# Patient Record
Sex: MALE | Race: WHITE | NOT HISPANIC OR LATINO | Employment: FULL TIME | URBAN - METROPOLITAN AREA
[De-identification: names, ages, dates, MRNs, and addresses within clinical notes are randomized per-mention and may not be internally consistent; named-entity substitution may affect disease eponyms.]

---

## 2023-08-15 ENCOUNTER — OFFICE VISIT (OUTPATIENT)
Dept: URGENT CARE | Facility: CLINIC | Age: 33
End: 2023-08-15
Payer: OTHER GOVERNMENT

## 2023-08-15 VITALS
RESPIRATION RATE: 16 BRPM | BODY MASS INDEX: 26.68 KG/M2 | SYSTOLIC BLOOD PRESSURE: 112 MMHG | DIASTOLIC BLOOD PRESSURE: 66 MMHG | HEIGHT: 67 IN | OXYGEN SATURATION: 95 % | WEIGHT: 170 LBS | TEMPERATURE: 98 F | HEART RATE: 82 BPM

## 2023-08-15 DIAGNOSIS — H66.002 ACUTE SUPPURATIVE OTITIS MEDIA OF LEFT EAR WITHOUT SPONTANEOUS RUPTURE OF TYMPANIC MEMBRANE, RECURRENCE NOT SPECIFIED: ICD-10-CM

## 2023-08-15 DIAGNOSIS — J01.90 ACUTE NON-RECURRENT SINUSITIS, UNSPECIFIED LOCATION: ICD-10-CM

## 2023-08-15 DIAGNOSIS — R05.9 COUGH, UNSPECIFIED TYPE: ICD-10-CM

## 2023-08-15 PROCEDURE — 99203 OFFICE O/P NEW LOW 30 MIN: CPT | Performed by: PHYSICIAN ASSISTANT

## 2023-08-15 PROCEDURE — 3074F SYST BP LT 130 MM HG: CPT | Performed by: PHYSICIAN ASSISTANT

## 2023-08-15 PROCEDURE — 3078F DIAST BP <80 MM HG: CPT | Performed by: PHYSICIAN ASSISTANT

## 2023-08-15 RX ORDER — PREDNISONE 20 MG/1
20 TABLET ORAL 2 TIMES DAILY
Qty: 10 TABLET | Refills: 0 | Status: SHIPPED | OUTPATIENT
Start: 2023-08-15 | End: 2023-08-20

## 2023-08-15 RX ORDER — AMOXICILLIN AND CLAVULANATE POTASSIUM 875; 125 MG/1; MG/1
1 TABLET, FILM COATED ORAL 2 TIMES DAILY
Qty: 14 TABLET | Refills: 0 | Status: SHIPPED | OUTPATIENT
Start: 2023-08-15 | End: 2023-08-22

## 2023-08-15 RX ORDER — BENZONATATE 200 MG/1
200 CAPSULE ORAL 3 TIMES DAILY PRN
Qty: 60 CAPSULE | Refills: 0 | Status: SHIPPED | OUTPATIENT
Start: 2023-08-15

## 2023-08-21 ASSESSMENT — ENCOUNTER SYMPTOMS
SINUS PAIN: 1
SHORTNESS OF BREATH: 0
FEVER: 0
SORE THROAT: 0
WHEEZING: 0
HEADACHES: 1
MYALGIAS: 1
COUGH: 1
STRIDOR: 0
SPUTUM PRODUCTION: 1

## 2023-08-21 NOTE — PROGRESS NOTES
"Subjective     Vijay Kerns is a 33 y.o. male who presents with Otalgia (X2 1/2 days left ear, congestion )            Patient presents clinic today with complaint of left ear pain and pressure for the last 3 days.  PT has had nasal congestion and cough for the last two weeks.  PT son and wife are both sick on son is on abx and steroids, wife is on steroids for cough.  Patient and his family have been traveling for the last several weeks, they live in [place] but have been to [place] as well as [place] in the last 3 weeks.  All COVID testing has been negative.  Patient denies fever, chills, nausea, vomiting or dizziness.  Patient denies any other complaint.    Otalgia   There is pain in the left ear. This is a new problem. The current episode started in the past 7 days. The problem occurs constantly. The problem has been rapidly worsening. There has been no fever. The pain is moderate. Associated symptoms include coughing and headaches. Pertinent negatives include no ear discharge, hearing loss or sore throat. He has tried ear drops, NSAIDs and heat packs for the symptoms. The treatment provided no relief. There is no history of a chronic ear infection, hearing loss or a tympanostomy tube.       Review of Systems   Constitutional:  Positive for malaise/fatigue. Negative for fever.   HENT:  Positive for congestion, ear pain and sinus pain. Negative for ear discharge, hearing loss and sore throat.    Respiratory:  Positive for cough and sputum production. Negative for shortness of breath, wheezing and stridor.    Musculoskeletal:  Positive for myalgias.   Neurological:  Positive for headaches.   All other systems reviewed and are negative.             Objective     /66 (BP Location: Left arm, Patient Position: Sitting, BP Cuff Size: Adult)   Pulse 82   Temp 36.7 °C (98 °F) (Temporal)   Resp 16   Ht 1.702 m (5' 7\")   Wt 77.1 kg (170 lb)   SpO2 95%   BMI 26.63 kg/m²      Physical Exam  Vitals and nursing note " reviewed.   Constitutional:       General: He is not in acute distress.     Appearance: Normal appearance. He is well-developed, well-groomed and normal weight. He is not toxic-appearing.   HENT:      Head: Normocephalic and atraumatic.      Right Ear: Tympanic membrane and ear canal normal.      Left Ear: Decreased hearing noted. No drainage. A middle ear effusion is present. Tympanic membrane is injected, erythematous and bulging. Tympanic membrane is not retracted.      Nose: Congestion present.      Right Turbinates: Enlarged.      Left Turbinates: Enlarged.      Right Sinus: Maxillary sinus tenderness present.      Left Sinus: Maxillary sinus tenderness present.      Mouth/Throat:      Lips: Pink.      Mouth: Mucous membranes are moist.      Pharynx: Uvula midline.   Eyes:      Extraocular Movements: Extraocular movements intact.      Conjunctiva/sclera: Conjunctivae normal.      Pupils: Pupils are equal, round, and reactive to light.   Cardiovascular:      Rate and Rhythm: Normal rate and regular rhythm.      Heart sounds: Normal heart sounds.   Pulmonary:      Effort: Pulmonary effort is normal.      Breath sounds: Normal breath sounds.   Abdominal:      Palpations: Abdomen is soft.   Musculoskeletal:         General: Normal range of motion.      Cervical back: Normal range of motion and neck supple.   Skin:     General: Skin is warm and dry.      Capillary Refill: Capillary refill takes less than 2 seconds.   Neurological:      General: No focal deficit present.      Mental Status: He is alert and oriented to person, place, and time.      Gait: Gait normal.   Psychiatric:         Mood and Affect: Mood normal.         Behavior: Behavior is cooperative.                             Assessment & Plan        1. Acute suppurative otitis media of left ear without spontaneous rupture of tympanic membrane, recurrence not specified     - amoxicillin-clavulanate (AUGMENTIN) 875-125 MG Tab; Take 1 Tablet by mouth 2  times a day for 7 days.  Dispense: 14 Tablet; Refill: 0  - predniSONE (DELTASONE) 20 MG Tab; Take 1 Tablet by mouth 2 times a day for 5 days.  Dispense: 10 Tablet; Refill: 0  - benzonatate (TESSALON) 200 MG capsule; Take 1 Capsule by mouth 3 times a day as needed for Cough.  Dispense: 60 Capsule; Refill: 0    2. Acute non-recurrent sinusitis, unspecified location     - amoxicillin-clavulanate (AUGMENTIN) 875-125 MG Tab; Take 1 Tablet by mouth 2 times a day for 7 days.  Dispense: 14 Tablet; Refill: 0  - predniSONE (DELTASONE) 20 MG Tab; Take 1 Tablet by mouth 2 times a day for 5 days.  Dispense: 10 Tablet; Refill: 0  - benzonatate (TESSALON) 200 MG capsule; Take 1 Capsule by mouth 3 times a day as needed for Cough.  Dispense: 60 Capsule; Refill: 0    3. Cough, unspecified type     - benzonatate (TESSALON) 200 MG capsule; Take 1 Capsule by mouth 3 times a day as needed for Cough.  Dispense: 60 Capsule; Refill: 0            Patient HPI and physical exam are consistent with acute suppurative otitis media of the left ear as well as sinusitis, causing postnasal drip and cough.  I will treat acute otitis media and sinusitis with Augmentin twice daily x7 days.  I have also prescribed prednisone for the inflammation in patient's ear as he is preparing to fly home in the next few days this will help with inflammation and hopefully allow the ear to drain.  I have also prescribed benzonatate for his cough.    PT can begin or continue OTC medications, increase fluids and rest until symptoms improve.     Differential diagnosis, supportive care, and indications for immediate follow-up discussed with patient.  Instructed to return to clinic or nearest emergency department for any change in condition, further concerns, or worsening of symptoms.    I personally reviewed prior external notes and test results pertinent to today's visit.  I have independently reviewed and interpreted all diagnostics ordered during this urgent care  visit.    PT should follow up with PCP in 1-2 days for re-evaluation if symptoms have not improved.      Discussed red flags and reasons to return to UC or ED.      Pt and/or family verbalized understanding of diagnosis and follow up instructions and was offered informational handout on diagnosis.  PT discharged.     Please note that this dictation was created using voice recognition software. I have made every reasonable attempt to correct obvious errors, but I expect that there may be errors of grammar and possibly content that I did not discover before finalizing the note.

## 2023-12-29 ENCOUNTER — OFFICE VISIT (OUTPATIENT)
Dept: URGENT CARE | Facility: CLINIC | Age: 33
End: 2023-12-29
Payer: OTHER GOVERNMENT

## 2023-12-29 VITALS
DIASTOLIC BLOOD PRESSURE: 80 MMHG | TEMPERATURE: 98.9 F | HEART RATE: 85 BPM | OXYGEN SATURATION: 99 % | RESPIRATION RATE: 16 BRPM | HEIGHT: 67 IN | WEIGHT: 175.6 LBS | BODY MASS INDEX: 27.56 KG/M2 | SYSTOLIC BLOOD PRESSURE: 120 MMHG

## 2023-12-29 DIAGNOSIS — J06.9 VIRAL URI WITH COUGH: ICD-10-CM

## 2023-12-29 DIAGNOSIS — J02.9 PHARYNGITIS, UNSPECIFIED ETIOLOGY: Primary | ICD-10-CM

## 2023-12-29 LAB
FLUAV RNA SPEC QL NAA+PROBE: NEGATIVE
FLUBV RNA SPEC QL NAA+PROBE: NEGATIVE
RSV RNA SPEC QL NAA+PROBE: NEGATIVE
S PYO DNA SPEC NAA+PROBE: NOT DETECTED
SARS-COV-2 RNA RESP QL NAA+PROBE: NEGATIVE

## 2023-12-29 PROCEDURE — 3079F DIAST BP 80-89 MM HG: CPT

## 2023-12-29 PROCEDURE — 99213 OFFICE O/P EST LOW 20 MIN: CPT

## 2023-12-29 PROCEDURE — 0241U POCT CEPHEID COV-2, FLU A/B, RSV - PCR: CPT

## 2023-12-29 PROCEDURE — 3074F SYST BP LT 130 MM HG: CPT

## 2023-12-29 PROCEDURE — 87651 STREP A DNA AMP PROBE: CPT

## 2023-12-29 RX ORDER — BENZOCAINE AND MENTHOL 15; 2.6 MG/1; MG/1
1 LOZENGE ORAL
Qty: 18 LOZENGE | Refills: 0 | Status: SHIPPED | OUTPATIENT
Start: 2023-12-29

## 2023-12-29 ASSESSMENT — ENCOUNTER SYMPTOMS
HEADACHES: 0
PALPITATIONS: 0
MYALGIAS: 0
EYE DISCHARGE: 0
NAUSEA: 0
EYE PAIN: 0
DIARRHEA: 0
FEVER: 1
CHILLS: 1
SPUTUM PRODUCTION: 0
SHORTNESS OF BREATH: 0
WHEEZING: 0
VOMITING: 0
COUGH: 1
SORE THROAT: 1
STRIDOR: 0
DIZZINESS: 0
ABDOMINAL PAIN: 0
EYE REDNESS: 0

## 2023-12-29 NOTE — PROGRESS NOTES
Subjective:   Vijay Kerns is a 33 y.o. male who presents for Congestion (X 2 days, congestion, headache, RT ear pain, sore throat, traveling back home in 3 day.)          I introduced myself to the patient and informed them that I am a family nurse practitioner.    HPI:Vijay comes in today c/o mild cough, nasal congestion, sore throat, body aches.  . Onset was 3 days ago.  Patient describes symptoms as constant. They describe the pain as sharp, burning in his throat. Aggravating factors include swallowing, worse in the morning. Relieving factors include none. Treatments tried at home include none. They describe their symptoms as moderate.  States no known exposure to COVID, flu, RSV, strep.  States his daughter at home is sick with the same symptoms.  Vaccinated against COVID x 3, states he did have flu shot this year.      Review of Systems   Constitutional:  Positive for chills, fever and malaise/fatigue.   HENT:  Positive for congestion, ear pain and sore throat.    Eyes:  Negative for pain, discharge and redness.   Respiratory:  Positive for cough. Negative for sputum production, shortness of breath, wheezing and stridor.    Cardiovascular:  Negative for chest pain and palpitations.   Gastrointestinal:  Negative for abdominal pain, diarrhea, nausea and vomiting.   Genitourinary:  Negative for dysuria.   Musculoskeletal:  Negative for myalgias.   Skin:  Negative for rash.   Neurological:  Negative for dizziness and headaches.       Medications: benzonatate     Allergies: Patient has no known allergies.    Problem List: does not have a problem list on file.    Surgical History:  No past surgical history on file.    Past Social Hx:   reports that he has never smoked. He has never used smokeless tobacco. He reports current alcohol use. He reports that he does not use drugs.     Past Family Hx:   family history is not on file.     Problem list, medications, and allergies reviewed by myself today in Epic.    "I have documented what I find to be significant in regards to past medical, social, family and surgical history  in my HPI or under PMH/PSH/FH review section, otherwise it is noncontributory     Objective:     /80   Pulse 85   Temp 37.2 °C (98.9 °F) (Temporal)   Resp 16   Ht 1.702 m (5' 7\")   Wt 79.7 kg (175 lb 9.6 oz)   SpO2 99%   BMI 27.50 kg/m²     During this visit, appropriate PPE was worn, and hand hygiene was performed.    Physical Exam  Vitals reviewed.   Constitutional:       General: He is not in acute distress.     Appearance: Normal appearance. He is not ill-appearing or toxic-appearing.   HENT:      Head: Normocephalic and atraumatic.      Right Ear: Tympanic membrane, ear canal and external ear normal. There is no impacted cerumen.      Left Ear: Tympanic membrane, ear canal and external ear normal. There is no impacted cerumen.      Nose: Congestion present. No rhinorrhea.      Mouth/Throat:      Pharynx: Oropharynx is clear. Posterior oropharyngeal erythema present. No oropharyngeal exudate.   Eyes:      General: No scleral icterus.        Right eye: No discharge.         Left eye: No discharge.      Conjunctiva/sclera: Conjunctivae normal.      Pupils: Pupils are equal, round, and reactive to light.   Cardiovascular:      Rate and Rhythm: Normal rate and regular rhythm.      Heart sounds: Normal heart sounds. No murmur heard.     No friction rub. No gallop.   Pulmonary:      Effort: Pulmonary effort is normal. No respiratory distress.      Breath sounds: Normal breath sounds. No stridor. No wheezing, rhonchi or rales.   Abdominal:      General: There is no distension.   Musculoskeletal:         General: Normal range of motion.      Cervical back: Normal range of motion. No rigidity or tenderness.      Right lower leg: No edema.      Left lower leg: No edema.   Lymphadenopathy:      Cervical: No cervical adenopathy.   Skin:     General: Skin is warm and dry.      Coloration: Skin is not " jaundiced.   Neurological:      Mental Status: He is alert and oriented to person, place, and time. Mental status is at baseline.   Psychiatric:         Mood and Affect: Mood normal.         Behavior: Behavior normal.         Thought Content: Thought content normal.         Judgment: Judgment normal.       Lab Results/POC Test Results   Results for orders placed or performed in visit on 12/29/23   POCT CEPHEID GROUP A STREP - PCR   Result Value Ref Range    POC Group A Strep, PCR Not Detected Not Detected, Invalid   POCT CEPHEID COV-2, FLU A/B, RSV - PCR   Result Value Ref Range    SARS-CoV-2 by PCR Negative Negative, Invalid    Influenza virus A RNA Negative Negative, Invalid    Influenza virus B, PCR Negative Negative, Invalid    RSV, PCR Negative Negative, Invalid             Assessment/Plan:     Diagnosis and associated orders:     1. Pharyngitis, unspecified etiology  POCT CEPHEID GROUP A STREP - PCR    POCT CEPHEID COV-2, FLU A/B, RSV - PCR    benzocaine-menthol (CEPACOL EXTRA STRENGTH) 15-2.6 MG Lozenge lozenge      2. Viral URI with cough           Comments/MDM:     1. Pharyngitis, unspecified etiology  - POCT CEPHEID GROUP A STREP - PCR  - POCT CEPHEID COV-2, FLU A/B, RSV - PCR  - benzocaine-menthol (CEPACOL EXTRA STRENGTH) 15-2.6 MG Lozenge lozenge; Dissolve 1 Lozenge in the mouth every 2 hours as needed (sore throat).  Dispense: 18 Lozenge; Refill: 0    2. Viral URI with cough  I instructed patient that if anything was positive on PCR testing I would call him, otherwise [facility]  He states he is agreeable.  In clinic we discussed viral versus bacterial infection, and I informed patient that they have a self-limiting viral URI at this time and antibiotics are not an effective treatment for this.    I instructed them that the management for this is supportive care-we discussed cough/deep breathing exercises, drink plenty of fluids, get plenty of rest, try a humidifier in  bedroom at night to help them sleep, gargle with salt water/honey/OTC Cepacol lozenges to help ease sore throat.    I instr patient they may use OTC cold and flu meds to treat symptoms  (caution regarding checking ingredients as some meds contain tylenol); may use tylenol alternated with ibuprofen for pain/fever - may take tylenol 1000mg every 6 hours, do not exceed 3000 mg in 24 hours; ibuprofen (if not contraindicated) start with lowest effective dose, 200mg every 8 hours, may increase to up to 400 mg every 8 hours if needed, take with food.  Patient was instructed that they should feel better in 7-10 days, (but some viral illnesses can last 2 weeks or longer, with residual cough possible for over a month) but to return to clinic if symptoms do not improve or worsen after 10-14 days.   We did discuss red flags and when to return to urgent care versus when to go to the emergency room and strict ER precautions were given.   Patient was encouraged to get a pharmacy consult when picking up any medication's.   I did print written instructions for patient, and did go over the diagnosis including differentials, and side effects of each medication with them and answer their questions to the best of my ability.   Patient states they have good understanding of instructions, and are agreeable with the plan of care.         Pt is clinically stable at today's acute urgent care visit. Vital signs are normal and reassuring.  No acute distress noted. Appropriate for outpatient management at this time.        I personally reviewed prior external notes and test results pertinent to today's visit.  I have independently reviewed and interpreted all diagnostics ordered during this urgent care acute visit.        Please note that this dictation was created using voice recognition software. I have made a reasonable attempt to correct obvious errors, but I expect that there are errors of grammar and possibly content that I did not  discover before finalizing the note.    This note was electronically signed by MIRTHA Gonzalez, LISA, SHLOMO